# Patient Record
Sex: MALE | Race: BLACK OR AFRICAN AMERICAN | NOT HISPANIC OR LATINO | RURAL
[De-identification: names, ages, dates, MRNs, and addresses within clinical notes are randomized per-mention and may not be internally consistent; named-entity substitution may affect disease eponyms.]

---

## 2022-02-06 ENCOUNTER — PREPPED CHART (OUTPATIENT)
Dept: RURAL CLINIC 3 | Facility: CLINIC | Age: 72
End: 2022-02-06

## 2022-02-06 ASSESSMENT — KERATOMETRY
OS_AXISANGLE_DEGREES: 25
OS_AXISANGLE2_DEGREES: 115
OD_K1POWER_DIOPTERS: 45.50
OS_K1POWER_DIOPTERS: 45.50
OD_AXISANGLE_DEGREES: 170
OD_K2POWER_DIOPTERS: 46.50
OS_K2POWER_DIOPTERS: 46.25
OD_AXISANGLE2_DEGREES: 080

## 2022-02-06 ASSESSMENT — TONOMETRY
OS_IOP_MMHG: 20
OD_IOP_MMHG: 16

## 2022-02-06 ASSESSMENT — VISUAL ACUITY: OS_SC: 20/25

## 2022-02-07 ENCOUNTER — CONSULTATION/EVALUATION (OUTPATIENT)
Dept: RURAL CLINIC 3 | Facility: CLINIC | Age: 72
End: 2022-02-07

## 2022-02-07 DIAGNOSIS — H25.13: ICD-10-CM

## 2022-02-07 PROCEDURE — 99204 OFFICE O/P NEW MOD 45 MIN: CPT

## 2022-02-07 ASSESSMENT — VISUAL ACUITY
OS_CC: 20/25
OS_BAT: 20/300
OS_SC: 20/20
OS_AM: 20/20

## 2022-02-07 ASSESSMENT — KERATOMETRY
OD_K1POWER_DIOPTERS: 45.50
OD_AXISANGLE2_DEGREES: 080
OS_AXISANGLE_DEGREES: 25
OD_AXISANGLE_DEGREES: 170
OS_AXISANGLE2_DEGREES: 115
OD_K2POWER_DIOPTERS: 46.50
OS_K1POWER_DIOPTERS: 45.50
OS_K2POWER_DIOPTERS: 46.25

## 2022-02-07 ASSESSMENT — TONOMETRY
OD_IOP_MMHG: 16
OS_IOP_MMHG: 16

## 2022-02-07 NOTE — PATIENT DISCUSSION
(Surgical) Visually Significant (secondary to glare), discussed the risks, benefits, alternatives, and limitations of cataract surgery. The patient stated a full understanding and a desire to proceed with the procedure. The patient will need to return for pre-op appointment with cataract measurements and to have any additional questions answered, and start pre-operative eye drops as directed. Pt elects Stand/Trad OU & discussed need for bifocals. Post op inflammation anticipated, discussed need for bifocals. Pt elects to start with OS first then reevaluate OD after. Hx of Stroke OD , poor prognosis discussed 2nd.

## 2022-02-16 ENCOUNTER — PRE-OP/H&P (OUTPATIENT)
Dept: RURAL CLINIC 3 | Facility: CLINIC | Age: 72
End: 2022-02-16

## 2022-02-16 VITALS
DIASTOLIC BLOOD PRESSURE: 97 MMHG | HEIGHT: 74 IN | BODY MASS INDEX: 28.88 KG/M2 | HEART RATE: 73 BPM | WEIGHT: 225 LBS | SYSTOLIC BLOOD PRESSURE: 163 MMHG

## 2022-02-16 DIAGNOSIS — Z01.818: ICD-10-CM

## 2022-02-16 PROCEDURE — 99499 UNLISTED E&M SERVICE: CPT

## 2022-02-18 ASSESSMENT — VISUAL ACUITY
OS_CC: 20/25
OS_SC: 20/20
OS_BAT: 20/300
OS_AM: 20/20

## 2022-02-18 ASSESSMENT — KERATOMETRY
OD_K2POWER_DIOPTERS: 46.50
OS_K1POWER_DIOPTERS: 45.50
OD_K1POWER_DIOPTERS: 45.50
OD_AXISANGLE_DEGREES: 170
OS_K2POWER_DIOPTERS: 46.25
OS_AXISANGLE_DEGREES: 25
OD_AXISANGLE2_DEGREES: 080
OS_AXISANGLE2_DEGREES: 115

## 2022-03-03 ENCOUNTER — POST OP/EVAL OF SECOND EYE (OUTPATIENT)
Dept: RURAL CLINIC 3 | Facility: CLINIC | Age: 72
End: 2022-03-03

## 2022-03-03 ENCOUNTER — SURGERY/PROCEDURE (OUTPATIENT)
Dept: RURAL CLINIC 4 | Facility: CLINIC | Age: 72
End: 2022-03-03

## 2022-03-03 VITALS
WEIGHT: 220 LBS | HEART RATE: 66 BPM | DIASTOLIC BLOOD PRESSURE: 94 MMHG | BODY MASS INDEX: 28.23 KG/M2 | HEIGHT: 74 IN | SYSTOLIC BLOOD PRESSURE: 154 MMHG

## 2022-03-03 DIAGNOSIS — H25.12: ICD-10-CM

## 2022-03-03 DIAGNOSIS — Z98.42: ICD-10-CM

## 2022-03-03 PROCEDURE — 66984 XCAPSL CTRC RMVL W/O ECP: CPT

## 2022-03-03 PROCEDURE — 99024 POSTOP FOLLOW-UP VISIT: CPT

## 2022-03-03 ASSESSMENT — KERATOMETRY
OS_AXISANGLE2_DEGREES: 115
OS_AXISANGLE_DEGREES: 25
OD_K2POWER_DIOPTERS: 46.50
OS_K1POWER_DIOPTERS: 45.50
OD_K2POWER_DIOPTERS: 46.50
OS_AXISANGLE2_DEGREES: 115
OS_K1POWER_DIOPTERS: 45.50
OD_AXISANGLE_DEGREES: 170
OD_AXISANGLE2_DEGREES: 080
OS_K2POWER_DIOPTERS: 46.25
OD_AXISANGLE_DEGREES: 170
OD_K1POWER_DIOPTERS: 45.50
OD_AXISANGLE2_DEGREES: 080
OD_K1POWER_DIOPTERS: 45.50
OS_AXISANGLE_DEGREES: 25
OS_K2POWER_DIOPTERS: 46.25

## 2022-03-03 ASSESSMENT — TONOMETRY
OD_IOP_MMHG: 18
OS_IOP_MMHG: 24

## 2022-03-03 ASSESSMENT — VISUAL ACUITY: OS_SC: 20/50-2

## 2022-03-03 NOTE — PATIENT DISCUSSION
Pt is doing well, lens in place & stable. Continue post op gtts & restrictions. Continue to monitor.

## 2022-03-03 NOTE — PATIENT DISCUSSION
(Surgical) Visually Significant (secondary to glare), discussed the risks, benefits, alternatives, and limitations of cataract surgery. The patient stated a full understanding and a desire to proceed with the procedure. The patient will need to return for pre-op appointment with cataract measurements and to have any additional questions answered, and start pre-operative eye drops as directed. Pt elects to proceed A/S.

## 2022-03-08 ENCOUNTER — POST-OP (OUTPATIENT)
Dept: RURAL CLINIC 3 | Facility: CLINIC | Age: 72
End: 2022-03-08

## 2022-03-08 DIAGNOSIS — Z98.42: ICD-10-CM

## 2022-03-08 PROCEDURE — 99024 POSTOP FOLLOW-UP VISIT: CPT

## 2022-03-08 ASSESSMENT — TONOMETRY
OS_IOP_MMHG: 18
OD_IOP_MMHG: 18

## 2022-03-08 ASSESSMENT — VISUAL ACUITY
OS_SC: 20/70+1
OS_PH: 20/40

## 2022-03-08 ASSESSMENT — KERATOMETRY
OD_K2POWER_DIOPTERS: 46.50
OS_AXISANGLE2_DEGREES: 115
OS_AXISANGLE_DEGREES: 25
OS_K1POWER_DIOPTERS: 45.50
OD_AXISANGLE_DEGREES: 170
OS_K2POWER_DIOPTERS: 46.25
OD_K1POWER_DIOPTERS: 45.50
OD_AXISANGLE2_DEGREES: 080

## 2022-03-08 NOTE — PATIENT DISCUSSION
Discussed diagnosis in detail with patient. Patient is stable and doing well. Continue all post op drops as directed, can now return to normal daily routine. Continue to monitor closely for changes. RTC 3 weeks for recheck w/ MR.

## 2022-03-16 ASSESSMENT — KERATOMETRY
OS_AXISANGLE_DEGREES: 25
OS_K1POWER_DIOPTERS: 45.50
OD_AXISANGLE_DEGREES: 170
OS_K2POWER_DIOPTERS: 46.25
OD_AXISANGLE2_DEGREES: 080
OS_AXISANGLE2_DEGREES: 115
OD_K1POWER_DIOPTERS: 45.50
OD_K2POWER_DIOPTERS: 46.50

## 2022-04-06 ENCOUNTER — PRE-OP/H&P (OUTPATIENT)
Dept: RURAL CLINIC 3 | Facility: CLINIC | Age: 72
End: 2022-04-06

## 2022-04-06 VITALS
HEIGHT: 72 IN | SYSTOLIC BLOOD PRESSURE: 152 MMHG | HEART RATE: 88 BPM | BODY MASS INDEX: 29.8 KG/M2 | DIASTOLIC BLOOD PRESSURE: 92 MMHG | WEIGHT: 220 LBS

## 2022-04-06 DIAGNOSIS — Z01.818: ICD-10-CM

## 2022-04-06 PROCEDURE — 99499 UNLISTED E&M SERVICE: CPT

## 2022-04-06 ASSESSMENT — KERATOMETRY
OD_AXISANGLE_DEGREES: 170
OS_AXISANGLE2_DEGREES: 115
OS_K1POWER_DIOPTERS: 45.50
OD_K1POWER_DIOPTERS: 45.50
OD_AXISANGLE2_DEGREES: 080
OD_K2POWER_DIOPTERS: 46.50
OS_AXISANGLE_DEGREES: 25
OS_K2POWER_DIOPTERS: 46.25

## 2022-04-06 ASSESSMENT — VISUAL ACUITY: OS_SC: 20/50-2

## 2022-05-05 ENCOUNTER — POST-OP (OUTPATIENT)
Dept: RURAL CLINIC 3 | Facility: CLINIC | Age: 72
End: 2022-05-05

## 2022-05-05 ENCOUNTER — SURGERY/PROCEDURE (OUTPATIENT)
Dept: RURAL CLINIC 4 | Facility: CLINIC | Age: 72
End: 2022-05-05

## 2022-05-05 DIAGNOSIS — Z96.1: ICD-10-CM

## 2022-05-05 DIAGNOSIS — H25.11: ICD-10-CM

## 2022-05-05 PROCEDURE — 99024 POSTOP FOLLOW-UP VISIT: CPT

## 2022-05-05 PROCEDURE — 68841 INSJ RX ELUT IMPLT LAC CANAL: CPT

## 2022-05-05 PROCEDURE — 66984 XCAPSL CTRC RMVL W/O ECP: CPT

## 2022-05-05 ASSESSMENT — TONOMETRY
OD_IOP_MMHG: 16
OS_IOP_MMHG: 16

## 2022-05-05 ASSESSMENT — VISUAL ACUITY
OS_SC: 20/20-1
OD_SC: CF 1FT